# Patient Record
Sex: MALE | Race: WHITE
[De-identification: names, ages, dates, MRNs, and addresses within clinical notes are randomized per-mention and may not be internally consistent; named-entity substitution may affect disease eponyms.]

---

## 2017-03-16 ENCOUNTER — HOSPITAL ENCOUNTER (EMERGENCY)
Dept: HOSPITAL 59 - ER | Age: 32
Discharge: HOME | End: 2017-03-16
Payer: COMMERCIAL

## 2017-03-16 DIAGNOSIS — Z87.442: ICD-10-CM

## 2017-03-16 DIAGNOSIS — R10.31: Primary | ICD-10-CM

## 2017-03-16 LAB
ALBUMIN SERPL-MCNC: 4.8 GM/DL (ref 3.5–5)
ALBUMIN/GLOB SERPL: 1.6 {RATIO} (ref 1.1–1.8)
ALP SERPL-CCNC: 77 U/L (ref 38–126)
ALT SERPL-CCNC: 37 U/L (ref 21–72)
ANION GAP SERPL CALC-SCNC: 15.6 MMOL/L (ref 7–16)
APPEARANCE UR: CLEAR
AST SERPL-CCNC: 20 U/L (ref 17–59)
BASOPHILS NFR BLD: 0.6 % (ref 0–6)
BILIRUB SERPL-MCNC: 0.23 MG/DL (ref 0.2–1.3)
BILIRUB UR-MCNC: NEGATIVE MG/DL
BUN SERPL-MCNC: 15 MG/DL (ref 9–20)
CO2 SERPL-SCNC: 18.4 MMOL/L (ref 22–30)
COLOR UR: YELLOW
CREAT SERPL-MCNC: 0.9 MG/DL (ref 0.66–1.25)
EOSINOPHIL NFR BLD: 1.9 % (ref 0–6)
ERYTHROCYTE [DISTWIDTH] IN BLOOD BY AUTOMATED COUNT: 16.4 % (ref 11.5–14.5)
EST GLOMERULAR FILTRATION RATE: > 60 ML/MIN
GLOBULIN SER-MCNC: 3 GM/DL (ref 1.4–4.8)
GLUCOSE SERPL-MCNC: 117 MG/DL (ref 70–110)
GLUCOSE UR STRIP-MCNC: NEGATIVE MG/DL
GRANULOCYTES NFR BLD: 60.8 % (ref 47–80)
HCT VFR BLD CALC: 32.7 % (ref 42–52)
HGB BLD-MCNC: 9.7 GM/DL (ref 14–18)
KETONES UR QL STRIP: NEGATIVE
LIPASE SERPL-CCNC: 309 U/L (ref 23–300)
LYMPHOCYTES NFR BLD AUTO: 28.5 % (ref 16–45)
MCH RBC QN AUTO: 24.9 PG (ref 27–33)
MCHC RBC AUTO-ENTMCNC: 29.7 G/DL (ref 32–36)
MCV RBC AUTO: 84.1 FL (ref 81–97)
MONOCYTES NFR BLD: 8.2 % (ref 0–9)
NITRITE UR QL STRIP: NEGATIVE
PLATELET # BLD: 157 K/UL (ref 130–400)
PMV BLD AUTO: 10.2 FL (ref 7.4–10.4)
PROT SERPL-MCNC: 7.8 GM/DL (ref 6.3–8.2)
PROT UR QL STRIP: NEGATIVE
RBC # BLD AUTO: 3.89 M/UL (ref 4.4–5.7)
RBC # UR STRIP: NEGATIVE /UL
URINE LEUKOCYTE ESTERASE: NEGATIVE
UROBILINOGEN UR STRIP-ACNC: 0.2 E.U./DL (ref 0.2–1)
WBC # BLD AUTO: 5.3 K/UL (ref 4.2–12.2)

## 2017-03-16 PROCEDURE — 80053 COMPREHEN METABOLIC PANEL: CPT

## 2017-03-16 PROCEDURE — 83690 ASSAY OF LIPASE: CPT

## 2017-03-16 PROCEDURE — 85025 COMPLETE CBC W/AUTO DIFF WBC: CPT

## 2017-03-16 PROCEDURE — 81003 URINALYSIS AUTO W/O SCOPE: CPT

## 2017-03-16 PROCEDURE — 96372 THER/PROPH/DIAG INJ SC/IM: CPT

## 2017-03-16 PROCEDURE — 99284 EMERGENCY DEPT VISIT MOD MDM: CPT

## 2017-03-16 PROCEDURE — 99283 EMERGENCY DEPT VISIT LOW MDM: CPT

## 2017-03-16 RX ADMIN — PHENOBARBITAL ELIXIR ONE LIQUID: 16.2; .1037; .0065; .0194 ELIXIR ORAL at 01:49

## 2017-03-16 RX ADMIN — KETOROLAC TROMETHAMINE ONE MG: 30 INJECTION, SOLUTION INTRAMUSCULAR; INTRAVENOUS at 00:33

## 2017-03-16 RX ADMIN — SUCRALFATE ONE G: 1 SUSPENSION ORAL at 01:19

## 2017-03-16 NOTE — EMERGENCY DEPARTMENT RECORD
History of Present Illness





- General


Chief Complaint: Abdominal Pain


Stated Complaint: RT SIDE PAIN


Time Seen by Provider: 17 00:15


Source: Patient


Mode of Arrival: Ambulatory


Limitations: No limitations





- History of Present Illness


Initial Comments: 


The patient is here due to a 6 hour hx of R flank pain. The pain is sharp and 

stabbing. He denies any nausea, vomiting, diarrhea, fever or dysuria. The 

patient was here in the ER 4 days ago for the same issue and was found to have 

a very slightly elevated Lipase, normal urine and a CT with a normal pancreas 

and no hydroureter or stone blockage. There was a very small stone in the 

kidney. He also has a hx of chronic AP and has had his GB removed in the past. 

The patient denies any black or bloody stools. He did have an EGD in  that demonstrated Gastritis.





MD Complaint: Abdominal pain


Onset/Timin


-: Hour(s)


Location: R Flank, RLQ


Severity: Moderate


Quality: Sharp


Consistency: Constant


Improves With: Rest


Worsens With: Movement


Context: Other


Associated Symptoms: Denies other symptoms





- Related Data


 Home Medications











 Medication  Instructions  Recorded  Confirmed  Last Taken


 


Olanzapine 15 mg PO QHS 02/16/15 03/16/17 06/10/16


 


Omeprazole [Omeprazole] 40 mg PO DAILY 05/01/15 03/16/17 06/11/16


 


Doxepin HCl [Sinequan] 50 mg PO BID 12/07/15 03/16/17 06/10/16


 


Topiramate [Topiramate ER] 200 mg PO BID 12/07/15 03/16/17 06/10/16








 Previous Rx's











 Medication  Instructions  Recorded


 


Sumatriptan Succinate [Imitrex] 100 mg PO ASDIR PRN #4  09/19/15


 


Dicyclomine HCl [Bentyl] 10 mg PO Q8H #8 cap 17


 


Ondansetron [Zofran Odt] 4 mg PO Q8H #12 tab.rapdis 17


 


Sucralfate [Carafate] 1 gm PO QID #28 tablet 17











 Allergies











Allergy/AdvReac Type Severity Reaction Status Date / Time


 


Sulfa (Sulfonamide Allergy  HIVES Verified 12/07/15 17:21





Antibiotics)     














Travel Screening





- Travel/Exposure Within Last 30 Days


Have you traveled within the last 30 days?: No





- Travel/Exposure Within Last Year


Have you traveled outside the [place] in the last year?: No





- Additonal Travel Details


Have you been exposed to anyone with a communicable illness?: No





- Travel Symptoms


Symptom Screening: None





Review of Systems


Constitutional: Denies: Chills, Fever


Eyes: Denies: Eye discharge


ENT: Denies: Congestion


Respiratory: Denies: Cough, Dyspnea





Past Medical History





- SOCIAL HISTORY


Smoking Status: Current every day smoker


Alcohol Use: None


Drug Use: None





- RESPIRATORY


Hx Respiratory Disorders: No





- CARDIOVASCULAR


Hx Cardio Disorders: Yes





- NEURO


Hx Neuro Disorders: Yes


Hx Headaches: Yes


Hx of Migraines: Yes


Hx Seizures: Yes


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





- GI


Hx GI Disorders: Yes


Hx Abdominal Pain: Yes


Hx Reflux: Yes


Hx Nausea/Vomiting: Yes


Hx Ulcer: Yes





- 


Hx Genitourinary Disorders: Yes


Hx Kidney Stones: Yes





- ENDOCRINE


Hx Endocrine Disorders: No


Hx Diabetes: No


Hx Thyroid Disease: No





- MUSCULOSKELETAL


Hx Musculoskeletal Disorders: Yes


Hx Back Injury: Yes





- PSYCH


Hx Psych Problems: Yes


Hx Depression: Yes (bipolar)





- HEMATOLOGY/ONCOLOGY


Hx Hematology/Oncology Disorders: No





Family Medical History


Any Significant Family History?: No


Hx Cancer: Grandparents


Hx Depression: Mother


Hx Diabetes: Mother





Physical Exam





- General


General Appearance: Alert, Cooperative, No acute distress (The patient is very 

comfortable and in no distress.)





- Head


Head exam: Atraumatic, Normocephalic, Normal inspection





- Eye


Eye exam: Normal appearance, PERRL





- Neck


Neck exam: Normal inspection, Full ROM.  negative: Tenderness





- Respiratory


Respiratory exam: Normal lung sounds bilaterally.  negative: Respiratory 

distress





- Cardiovascular


Cardiovascular Exam: Regular rate, Normal rhythm, Normal heart sounds





- GI/Abdominal


GI/Abdominal exam: Soft, Tenderness (There is very mild RUQ tenderness.).  

negative: Guarding, Pulsatile mass, Rebound, Rigid





- Extremities


Extremities exam: Normal inspection, Full ROM, Normal capillary refill.  

negative: Tenderness





Course





 Vital Signs











  17





  00:06 00:16


 


Temperature 98.1 F 98.1 F


 


Pulse Rate [  113 H





Pulse Ox Probe]  


 


Respiratory  18





Rate  


 


Blood Pressure  126/96





[Left Arm]  


 


Pulse Ox  97














- Reevaluation(s)


Reevaluation #1: 


The patient is doing much better at this time. He denies any significant pain 

or discomfort. He states the GI medicine is helping him.





17 01:47





Reevaluation #2: 


The patient is doing much better at this time and is resting comfortably and is 

texting away on his phone. His pain has resolved and he feels 100% better. He 

will limit his home pain medicines, start the carafate and F/U with Dr. Rodriguez 

for a possible EGD and Colonoscopy. On exam his abdomen is very soft and 

nontender in all 4 quads. He is up ambulating with no pain or discomfort.





17 01:59








17 02:00








17 02:09








Medical Decision Making





- Data Complexity


MDM Data: Labs Ordered and/or Reviewed





- Lab Data


Result diagrams: 


 17 00:30





 17 00:30





Disposition


Disposition: Discharge


Clinical Impression: 


Abdominal pain


Qualifiers:


 Abdominal location: right lower quadrant Qualified Code(s): R10.31 - Right 

lower quadrant pain


Disposition: Home, Self-Care


Condition: (1) Good


Instructions:  Abdominal Pain (ED)


Additional Instructions: 


Please continue your home medicines and limit your pain medicines if possible. 

Please take the Carafate for the next week. Please see your PCP if not better 

in 1-2 days and follow up with Dr. Rodriguez when possible. Return to the ER for 

any increased pain, fever, or vomiting.


Prescriptions: 


Sucralfate [Carafate] 1 gm PO QID #28 tablet


Referrals: 


[facility] [Provider Group]


NAZ RODRIGUEZ [DOCTOR OF OSTEOPATH] - 


Forms:  Patient Portal Access


Time of Disposition: 01:55

## 2017-05-12 ENCOUNTER — HOSPITAL ENCOUNTER (OUTPATIENT)
Dept: HOSPITAL 59 - HOP | Age: 32
Discharge: HOME | End: 2017-05-12
Attending: INTERNAL MEDICINE
Payer: MEDICARE

## 2017-05-12 DIAGNOSIS — R19.7: Primary | ICD-10-CM

## 2017-05-12 DIAGNOSIS — F31.9: ICD-10-CM

## 2017-05-12 DIAGNOSIS — K57.30: ICD-10-CM

## 2017-05-12 DIAGNOSIS — K64.8: ICD-10-CM

## 2017-05-12 DIAGNOSIS — G40.89: ICD-10-CM

## 2017-05-15 NOTE — OPERATIVE NOTE
DATE OF SURGERY: 05/12/2017 



SURGEON: Avery Reina MD



OPERATION: COLONOSCOPY. 



INDICATIONS: This is a 31-year-old male with history of chronic diarrhea who presented for 
colonoscopy. 



POSTOPERATIVE DIAGNOSES:

1. Normal colonic and terminal ileum mucosa with no ulcerative or neoplastic lesions. 

2. Scattered diverticula, left colonic diverticulosis. 



ANESTHESIA: Per the department of anesthesia, the pulse oximetry was monitored throughout the 
duration of the procedure to maintain O2 saturation of 90% or greater. Supplemental oxygen was 
administered via nasal cannula. Cardiac and vital signs were monitored throughout the duration of 
the procedure, and they were stable.  



Description of the procedure of colonoscopy and risks and benefits of the procedure including the 
risk of bleeding and perforation, among others, were explained to the patient who voiced 
understanding and agreed to have the procedure done. Physical exam was performed, and the patient 
was found stable for sedation. 



PROCEDURE: The patient was then placed in the left lateral position, and sedation was initiated. 
Digital rectal exam was performed and showed some external hemorrhoids with no palpable rectal 
masses. The Olympus PCF-180AL colonoscope was then inserted into the rectum under direct 
visualization and advanced to the cecum without difficulty. The ileocecal valve and appendiceal 
orifice were identified and photographed. The colonic mucosa was carefully examined upon 
introduction of the colonoscope. There were scattered diverticula noted in the sigmoid and 
descending colon. There were no other lesions noted. The colonoscope was then withdrawn while 
carefully examining the colonic mucosal surfaces. No other lesions were noted. The ileocecal valve 
was intubated and terminal ileum mucosa was inspected for about 10 cm and it appeared normal. The 
colonoscope was then withdrawn while carefully examining the colonic mucosal surfaces. No other 
lesions were noted. In the rectum, retroflexion was performed and grade 1 internal hemorrhoids were 
noted. The colonoscope was then withdrawn, and the procedures were terminated. Random colon biopsies 
were obtained to rule out microscopic colitis. He remained with stable vital signs and was 
transferred to the recovery room. 



RECOMMENDATIONS: 

1. The patient should be on a high-fiber diet. 

2. He should have a repeat colonoscopy at age 49.  



Thank you for allowing me to participate in the care of your patient. 





_________________________

Avery Reina MD



CC: Dr. Laverne ROBIN

## 2017-05-29 ENCOUNTER — HOSPITAL ENCOUNTER (EMERGENCY)
Dept: HOSPITAL 59 - ER | Age: 32
Discharge: HOME | End: 2017-05-29
Payer: MEDICARE

## 2017-05-29 DIAGNOSIS — R10.12: ICD-10-CM

## 2017-05-29 DIAGNOSIS — R11.2: ICD-10-CM

## 2017-05-29 DIAGNOSIS — R74.8: Primary | ICD-10-CM

## 2017-05-29 DIAGNOSIS — R10.11: ICD-10-CM

## 2017-05-29 LAB
ALBUMIN SERPL-MCNC: 4.5 GM/DL (ref 3.5–5)
ALBUMIN/GLOB SERPL: 1.5 {RATIO} (ref 1.1–1.8)
ALP SERPL-CCNC: 134 U/L (ref 38–126)
ALT SERPL-CCNC: 417 U/L (ref 21–72)
ANION GAP SERPL CALC-SCNC: 7.8 MMOL/L (ref 7–16)
APPEARANCE UR: CLEAR
AST SERPL-CCNC: 189 U/L (ref 17–59)
BASOPHILS NFR BLD: 1 % (ref 0–6)
BILIRUB SERPL-MCNC: 0.42 MG/DL (ref 0.2–1.3)
BILIRUB UR-MCNC: NEGATIVE MG/DL
BUN SERPL-MCNC: 14 MG/DL (ref 9–20)
CO2 SERPL-SCNC: 19.2 MMOL/L (ref 22–30)
COLOR UR: YELLOW
CREAT SERPL-MCNC: 1.4 MG/DL (ref 0.66–1.25)
EOSINOPHIL NFR BLD: 2.4 % (ref 0–6)
ERYTHROCYTE [DISTWIDTH] IN BLOOD BY AUTOMATED COUNT: 21.4 % (ref 11.5–14.5)
EST GLOMERULAR FILTRATION RATE: > 60 ML/MIN
GLOBULIN SER-MCNC: 3 GM/DL (ref 1.4–4.8)
GLUCOSE SERPL-MCNC: 68 MG/DL (ref 70–110)
GLUCOSE UR STRIP-MCNC: NEGATIVE MG/DL
GRANULOCYTES NFR BLD: 64.5 % (ref 47–80)
HCT VFR BLD CALC: 40.4 % (ref 42–52)
HGB BLD-MCNC: 12.4 GM/DL (ref 14–18)
KETONES UR QL STRIP: NEGATIVE
LIPASE SERPL-CCNC: 183 U/L (ref 23–300)
LYMPHOCYTES NFR BLD AUTO: 26.3 % (ref 16–45)
MCH RBC QN AUTO: 28.4 PG (ref 27–33)
MCHC RBC AUTO-ENTMCNC: 30.7 G/DL (ref 32–36)
MCV RBC AUTO: 92.7 FL (ref 81–97)
MONOCYTES NFR BLD: 5.8 % (ref 0–9)
NITRITE UR QL STRIP: NEGATIVE
PLATELET # BLD: 255 K/UL (ref 130–400)
PMV BLD AUTO: 9.8 FL (ref 7.4–10.4)
PROT SERPL-MCNC: 7.5 GM/DL (ref 6.3–8.2)
PROT UR QL STRIP: NEGATIVE
RBC # BLD AUTO: 4.36 M/UL (ref 4.4–5.7)
RBC # UR STRIP: NEGATIVE /UL
URINE LEUKOCYTE ESTERASE: NEGATIVE
UROBILINOGEN UR STRIP-ACNC: 0.2 E.U./DL (ref 0.2–1)
WBC # BLD AUTO: 7.2 K/UL (ref 4.2–12.2)

## 2017-05-29 PROCEDURE — 96374 THER/PROPH/DIAG INJ IV PUSH: CPT

## 2017-05-29 PROCEDURE — 85025 COMPLETE CBC W/AUTO DIFF WBC: CPT

## 2017-05-29 PROCEDURE — 81003 URINALYSIS AUTO W/O SCOPE: CPT

## 2017-05-29 PROCEDURE — 83690 ASSAY OF LIPASE: CPT

## 2017-05-29 PROCEDURE — 80053 COMPREHEN METABOLIC PANEL: CPT

## 2017-05-29 PROCEDURE — 99284 EMERGENCY DEPT VISIT MOD MDM: CPT

## 2017-05-29 NOTE — EMERGENCY DEPARTMENT RECORD
History of Present Illness





- General


Chief complaint: Vomiting


Stated complaint: VOMITING/DRY HEAVES


Time Seen by Provider: 05/29/17 02:45


Source: Patient, Family


Mode of Arrival: Ambulatory


Limitations: No limitations





- History of Present Illness


Initial comments: 





pt vomited 3x tonight and had a bout of diarrhea


MD complaint: Nausea, Vomiting


Onset/Timing: 3


-: Hour(s)


Associated Abdominal Pain: Yes


Location: LUQ, RUQ


Radiation: None


Severity: Mild


Severity scale (1-10): 8


Quality: Dull, Other


Consistency: Constant


Improves with: Rest


Worsens with: Movement


Associated Symptoms: Nausea/vomiting





- Related Data


 Home Medications











 Medication  Instructions  Recorded  Confirmed  Last Taken


 


Olanzapine 15 mg PO QHS 02/16/15 05/29/17 06/10/16


 


Doxepin HCl [Sinequan] 50 mg PO BID 12/07/15 05/29/17 06/10/16


 


Topiramate [Topiramate ER] 100 mg PO BID 12/07/15 05/29/17 06/10/16


 


Cholecalciferol (Vitamin D3) 2,000 unit PO DAILY 05/29/17 05/29/17 Unknown





[Vitamin D3]    


 


Dicyclomine HCl [Dicyclomine HCl] 20 mg PO QID 05/29/17 05/29/17 Unknown


 


Ferrous Sulfate 325 mg PO DAILY 05/29/17 05/29/17 Unknown


 


Folic Acid 1 mg PO DAILY 05/29/17 05/29/17 Unknown


 


Lactulose 10 gm PO DAILY 05/29/17 05/29/17 Unknown


 


Omeprazole [Prilosec] 40 mg PO DAILY 05/29/17 05/29/17 Unknown


 


Propranolol HCl [Propranolol HCl 60 mg PO DAILY 05/29/17 05/29/17 Unknown





ER]    











 Allergies











Allergy/AdvReac Type Severity Reaction Status Date / Time


 


Sulfa (Sulfonamide Allergy  HIVES Verified 12/07/15 17:21





Antibiotics)     














Travel Screening





- Travel/Exposure Within Last 30 Days


Have you traveled within the last 30 days?: No





- Travel/Exposure Within Last Year


Have you traveled outside the [place] in the last year?: No





- Additonal Travel Details


Have you been exposed to anyone with a communicable illness?: No





- Travel Symptoms


Symptom Screening: None





Review of Systems


Reviewed: No additional complaints except as noted below


Constitutional: Reports: As per HPI.  Denies: Chills, Fever, Malaise, Night 

sweats, Weakness, Weight change


Eyes: Reports: As per HPI.  Denies: Eye discharge, Eye pain, Photophobia, 

Vision change


ENT: Reports: As per HPI.  Denies: Congestion, Dental pain, Ear pain, Epistaxis

, Hearing loss, Throat pain


Respiratory: Reports: As per HPI.  Denies: Cough, Dyspnea, Hemoptysis, Stridor, 

Wheezes


Cardiovascular: Reports: As per HPI.  Denies: Arrhythmia, Chest pain, Dyspnea 

on exertion, Edema, Murmurs, Orthopnea, Palpitations, Paroxysmal nocturnal 

dyspnea, Rheumatic Fever, Syncope


Endocrine: Reports: As per HPI.  Denies: Fatigue, Heat or cold intolerance, 

Polydipsia, Polyuria


Gastrointestinal: Reports: As per HPI.  Denies: Abdominal pain, Constipation, 

Diarrhea, Hematemesis, Hematochezia, Melena, Nausea, Vomiting


Genitourinary: Reports: As per HPI.  Denies: Dysuria, Frequency, Hematuria, 

Incontinence, Retention, Testicular pain, Testicular mass, Urgency


Musculoskeletal: Reports: As per HPI.  Denies: Arthralgia, Back pain, Gout, 

Joint swelling, Myalgia, Neck pain


Skin: Reports: As per HPI.  Denies: Bruising, Change in color, Change in hair/

nails, Lesions, Pruritus, Rash


Neurological: Reports: As per HPI.  Denies: Abnormal gait, Confusion, Headache, 

Numbness, Paresthesias, Seizure, Tingling, Tremors, Vertigo, Weakness


Psychiatric: Reports: As per HPI.  Denies: Anxiety, Auditory hallucinations, 

Depression, Homicidal thoughts, Suicidal thoughts, Visual hallucinations


Hematological/Lymphatic: Reports: As per HPI.  Denies: Anemia, Blood Clots, 

Easy bleeding, Easy bruising, Swollen glands





Past Medical History





- SOCIAL HISTORY


Smoking Status: Current every day smoker


Alcohol Use: None


Drug Use: None





- RESPIRATORY


Hx Respiratory Disorders: No





- CARDIOVASCULAR


Hx Cardio Disorders: Yes





- NEURO


Hx Neuro Disorders: Yes


Hx Headaches: Yes


Hx of Migraines: Yes


Hx Seizures: Yes


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





- GI


Hx GI Disorders: Yes


Hx Abdominal Pain: Yes


Hx Reflux: Yes


Hx Ulcer: Yes





- 


Hx Genitourinary Disorders: Yes


Hx Kidney Stones: Yes





- ENDOCRINE


Hx Endocrine Disorders: No


Hx Diabetes: No


Hx Thyroid Disease: No





- MUSCULOSKELETAL


Hx Musculoskeletal Disorders: Yes


Hx Back Injury: Yes





- PSYCH


Hx Psych Problems: Yes


Hx Depression: Yes (bipolar)





- HEMATOLOGY/ONCOLOGY


Hx Hematology/Oncology Disorders: No





Family Medical History


Any Significant Family History?: No


Hx Cancer: Grandparents


Hx Depression: Mother


Hx Diabetes: Mother





Physical Exam





- General


General Appearance: Alert, Oriented x3, Cooperative, Mild distress





- Head


Head exam: Normal inspection





- Eye


Eye exam: Normal appearance, PERRL, EOMI


Pupils: Normal accommodation





- ENT


ENT exam: Normal exam, Mucous membranes moist, Normal external ear exam, Normal 

orophraynx, TM's normal bilaterally


Ear exam: Normal external inspection.  negative: External canal tenderness


Nasal Exam: Normal inspection.  negative: Discharge, Sinus tenderness


Mouth exam: Normal external inspection, Tongue normal


Teeth exam: Normal inspection.  negative: Dental caries


Throat exam: Normal inspection.  negative: Tonsillar erythema, Tonsillar exudate





- Neck


Neck exam: Normal inspection, Full ROM.  negative: Tenderness





- Respiratory


Respiratory exam: Normal lung sounds bilaterally.  negative: Respiratory 

distress





- Cardiovascular


Cardiovascular Exam: Regular rate, Normal rhythm, Normal heart sounds





- GI/Abdominal


GI/Abdominal exam: Soft, Normal bowel sounds.  negative: Tenderness





- Rectal


Rectal exam: Deferred





- 


 exam: Deferred





- Extremities


Extremities exam: Normal inspection, Full ROM, Normal capillary refill.  

negative: Tenderness





- Back


Back exam: Reports: Normal inspection, Full ROM.  Denies: Muscle spasm, Rash 

noted, Tenderness





- Neurological


Neurological exam: Alert, CN II-XII intact, Normal gait, Oriented X3





- Psychiatric


Psychiatric exam: Normal affect, Normal mood





- Skin


Skin exam: Dry, Intact, Normal color, Warm





Course





 Vital Signs











  05/29/17





  02:40


 


Temperature 98.8 F


 


Pulse Rate [ 78





Pulse Ox Probe] 


 


Respiratory 18





Rate 


 


Blood Pressure 124/73





[Left Arm] 


 


Pulse Ox 96














- Reevaluation(s)


Reevaluation #1: 





05/29/17 05:23


pt feels much better





Medical Decision Making





- Management Options


MDM Management: No Additional Work-up Planned





- Data Complexity


MDM Data: Labs Ordered and/or Reviewed





- Lab Data


Result diagrams: 


 05/29/17 02:30





 05/29/17 02:30





Disposition


Disposition: Discharge


Clinical Impression: 


 Elevated liver enzymes





Vomiting


Qualifiers:


 Vomiting type: unspecified Vomiting Intractability: unspecified Nausea presence

: with nausea Qualified Code(s): R11.2 - Nausea with vomiting, unspecified





Disposition: Home, Self-Care


Condition: (1) Good


Instructions:  Acute Nausea and Vomiting (ED)


Additional Instructions: 


follow up with family doctor on tuesday without fail.  return sooner if worse


Forms:  Patient Portal Access

## 2017-05-29 NOTE — EMERGENCY DEPARTMENT RECORD
History of Present Illness





- General


Chief complaint: Vomiting


Stated complaint: VOMITING/DRY HEAVES


Time Seen by Provider: 05/29/17 02:45


Source: Patient, Family


Mode of Arrival: Ambulatory


Limitations: No limitations





- History of Present Illness


MD complaint: Nausea, Vomiting


Onset/Timing: 3


-: Hour(s)


Associated Abdominal Pain: Yes


Location: LUQ, RUQ


Radiation: None


Severity: Mild


Severity scale (1-10): 8


Quality: Dull, Other


Consistency: Constant


Improves with: Rest


Worsens with: Movement


Associated Symptoms: Nausea/vomiting





- Related Data


 Home Medications











 Medication  Instructions  Recorded  Confirmed  Last Taken


 


Olanzapine 15 mg PO QHS 02/16/15 05/29/17 06/10/16


 


Doxepin HCl [Sinequan] 50 mg PO BID 12/07/15 05/29/17 06/10/16


 


Topiramate [Topiramate ER] 100 mg PO BID 12/07/15 05/29/17 06/10/16


 


Cholecalciferol (Vitamin D3) 2,000 unit PO DAILY 05/29/17 05/29/17 Unknown





[Vitamin D3]    


 


Dicyclomine HCl [Dicyclomine HCl] 20 mg PO QID 05/29/17 05/29/17 Unknown


 


Ferrous Sulfate 325 mg PO DAILY 05/29/17 05/29/17 Unknown


 


Folic Acid 1 mg PO DAILY 05/29/17 05/29/17 Unknown


 


Lactulose 10 gm PO DAILY 05/29/17 05/29/17 Unknown


 


Omeprazole [Prilosec] 40 mg PO DAILY 05/29/17 05/29/17 Unknown


 


Propranolol HCl [Propranolol HCl 60 mg PO DAILY 05/29/17 05/29/17 Unknown





ER]    











 Allergies











Allergy/AdvReac Type Severity Reaction Status Date / Time


 


Sulfa (Sulfonamide Allergy  HIVES Verified 12/07/15 17:21





Antibiotics)     














Travel Screening





- Travel/Exposure Within Last 30 Days


Have you traveled within the last 30 days?: No





- Travel/Exposure Within Last Year


Have you traveled outside the [place] in the last year?: No





- Additonal Travel Details


Have you been exposed to anyone with a communicable illness?: No





- Travel Symptoms


Symptom Screening: None





Review of Systems


Constitutional: Reports: As per HPI.  Denies: Chills, Fever, Malaise, Night 

sweats, Weakness, Weight change


Eyes: Reports: As per HPI.  Denies: Eye discharge, Eye pain, Photophobia, 

Vision change


ENT: Reports: As per HPI.  Denies: Congestion, Dental pain, Ear pain, Epistaxis

, Hearing loss, Throat pain


Respiratory: Reports: As per HPI.  Denies: Cough, Dyspnea, Hemoptysis, Stridor, 

Wheezes


Cardiovascular: Reports: As per HPI.  Denies: Arrhythmia, Chest pain, Dyspnea 

on exertion, Edema, Murmurs, Orthopnea, Palpitations, Paroxysmal nocturnal 

dyspnea, Rheumatic Fever, Syncope


Endocrine: Reports: As per HPI.  Denies: Fatigue, Heat or cold intolerance, 

Polydipsia, Polyuria


Gastrointestinal: Reports: As per HPI.  Denies: Abdominal pain, Constipation, 

Diarrhea, Hematemesis, Hematochezia, Melena, Nausea, Vomiting


Genitourinary: Reports: As per HPI.  Denies: Dysuria, Frequency, Hematuria, 

Incontinence, Retention, Testicular pain, Testicular mass, Urgency


Musculoskeletal: Reports: As per HPI.  Denies: Arthralgia, Back pain, Gout, 

Joint swelling, Myalgia, Neck pain


Skin: Reports: As per HPI.  Denies: Bruising, Change in color, Change in hair/

nails, Lesions, Pruritus, Rash


Neurological: Reports: As per HPI.  Denies: Abnormal gait, Confusion, Headache, 

Numbness, Paresthesias, Seizure, Tingling, Tremors, Vertigo, Weakness


Psychiatric: Reports: As per HPI.  Denies: Anxiety, Auditory hallucinations, 

Depression, Homicidal thoughts, Suicidal thoughts, Visual hallucinations


Hematological/Lymphatic: Reports: As per HPI.  Denies: Anemia, Blood Clots, 

Easy bleeding, Easy bruising, Swollen glands





Past Medical History





- SOCIAL HISTORY


Smoking Status: Current every day smoker


Alcohol Use: None


Drug Use: None





- RESPIRATORY


Hx Respiratory Disorders: No





- CARDIOVASCULAR


Hx Cardio Disorders: Yes





- NEURO


Hx Neuro Disorders: Yes


Hx Headaches: Yes


Hx of Migraines: Yes


Hx Seizures: Yes


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





- GI


Hx GI Disorders: Yes


Hx Abdominal Pain: Yes


Hx Reflux: Yes


Hx Ulcer: Yes





- 


Hx Genitourinary Disorders: Yes


Hx Kidney Stones: Yes





- ENDOCRINE


Hx Endocrine Disorders: No


Hx Diabetes: No


Hx Thyroid Disease: No





- MUSCULOSKELETAL


Hx Musculoskeletal Disorders: Yes


Hx Back Injury: Yes





- PSYCH


Hx Psych Problems: Yes


Hx Depression: Yes (bipolar)





- HEMATOLOGY/ONCOLOGY


Hx Hematology/Oncology Disorders: No





Family Medical History


Any Significant Family History?: No


Hx Cancer: Grandparents


Hx Depression: Mother


Hx Diabetes: Mother





Physical Exam





- General


Limitations: No limitations





Course





 Vital Signs











  05/29/17 05/29/17 05/29/17





  02:40 03:23 04:21


 


Temperature 98.8 F  98.6 F


 


Pulse Rate [ 78 60 66





Pulse Ox Probe]   


 


Respiratory 18 17 18





Rate   


 


Blood Pressure 124/73 128/89 118/86





[Left Arm]   


 


Pulse Ox 96  99














  05/29/17





  05:41


 


Temperature 


 


Pulse Rate [ 62





Pulse Ox Probe] 


 


Respiratory 16





Rate 


 


Blood Pressure 112/88





[Left Arm] 


 


Pulse Ox 98














Medical Decision Making





- Lab Data


Result diagrams: 


 05/29/17 02:30





 05/29/17 02:30





 Lab Results











  05/29/17 05/29/17 05/29/17 Range/Units





  02:30 02:30 05:30 


 


WBC  7.2    (4.2-12.2)  K/uL


 


RBC  4.36 L    (4.40-5.70)  M/uL


 


Hgb  12.4 L    (14.0-18.0)  gm/dl


 


Hct  40.4 L    (42.0-52.0)  %


 


MCV  92.7    (81-97)  fl


 


MCH  28.4    (27-33)  pg


 


MCHC  30.7 L    (32-36)  g/dl


 


RDW  21.4 H    (11.5-14.5)  %


 


Plt Count  255    (130-400)  K/uL


 


MPV  9.8    (7.4-10.4)  fl


 


Gran %  64.5    (47-80)  %


 


Lymphocytes %  26.3    (16-45)  %


 


Monocytes %  5.8    (0-9)  %


 


Eosinophils %  2.4    (0-6)  %


 


Basophils %  1.0    (0-6)  %


 


Sodium   141   (136-145)  mmol/L


 


Potassium   4.1   (3.5-5.1)  mmol/L


 


Chloride   114 H   ()  mmol/L


 


Carbon Dioxide   19.2 L   (22-30)  mmol/L


 


Anion Gap   7.8   (7-16)  


 


BUN   14   (9-20)  mg/dL


 


Creatinine   1.4 H   (0.66-1.25)  mg/dL


 


Estimated GFR   > 60   ml/min


 


Random Glucose   68 L   ()  mg/dL


 


Calcium   8.7   (8.5-10.1)  mg/dL


 


Total Bilirubin   0.42   (0.2-1.3)  mg/dL


 


AST   189 H   (17-59)  U/L


 


ALT   417 H   (21-72)  U/L


 


Alkaline Phosphatase   134 H   ()  U/L


 


Total Protein   7.5   (6.3-8.2)  gm/dL


 


Albumin   4.5   (3.5-5.0)  gm/dL


 


Globulin   3.0   (1.4-4.8)  gm/dL


 


Albumin/Globulin Ratio   1.5   (1.1-1.8)  


 


Lipase   183   ()  U/L


 


Urine Color    Yellow  


 


Urine Appearance    Clear  


 


Urine pH    5.5  (5.0-8.0)  


 


Ur Specific Gravity    >= 1.030  (1.002-1.030)  


 


Urine Protein    Negative  (NEGATIVE)  


 


Urine Glucose (UA)    Negative  (NEGATIVE)  


 


Urine Ketones    Negative  (NEGATIVE)  


 


Urine Blood    Negative  (NEGATIVE)  


 


Urine Nitrite    Negative  (NEGATIVE)  


 


Urine Bilirubin    Negative  (NEGATIVE)  


 


Urine Urobilinogen    0.2  (0.20 - 1.00)  E.U./dL


 


Ur Leukocyte Esterase    Negative  (NEGATIVE)  














Disposition


Clinical Impression: 


 Elevated liver enzymes





Vomiting


Qualifiers:


 Vomiting type: unspecified Vomiting Intractability: unspecified Nausea presence

: with nausea Qualified Code(s): R11.2 - Nausea with vomiting, unspecified





Disposition: Home, Self-Care


Condition: (1) Good


Instructions:  Acute Nausea and Vomiting (ED)


Additional Instructions: 


follow up with family doctor on tuesday without fail regarding elevated liver 

enzymes and creatinine.  return sooner if worse


Forms:  Patient Portal Access

## 2018-02-23 ENCOUNTER — HOSPITAL ENCOUNTER (EMERGENCY)
Dept: HOSPITAL 59 - ER | Age: 33
Discharge: HOME | End: 2018-02-23
Payer: MEDICARE

## 2018-02-23 DIAGNOSIS — R07.89: ICD-10-CM

## 2018-02-23 DIAGNOSIS — F17.210: ICD-10-CM

## 2018-02-23 DIAGNOSIS — R10.11: Primary | ICD-10-CM

## 2018-02-23 DIAGNOSIS — Z87.820: ICD-10-CM

## 2018-02-23 LAB
ALBUMIN SERPL-MCNC: 4.6 G/DL (ref 4–5)
ALP SERPL-CCNC: 102 U/L (ref 40–129)
ALT SERPL-CCNC: 12 U/L (ref ?–41)
ANION GAP SERPL CALC-SCNC: 14 MMOL/L (ref 7–16)
AST SERPL-CCNC: 11 U/L (ref 10–50)
BASOPHILS NFR BLD: 0.5 % (ref 0–6)
BILIRUB DIRECT SERPL-MCNC: < 0.2 MG/DL (ref 0–0.3)
BILIRUB SERPL-MCNC: 0.2 MG/DL (ref 0.2–1)
BUN SERPL-MCNC: 14 MG/DL (ref 6–20)
CK MB SERPL-MCNC: 1.1 NG/ML (ref ?–6.73)
CO2 SERPL-SCNC: 19 MMOL/L (ref 22–29)
CREAT SERPL-MCNC: 0.8 MG/DL (ref 0.7–1.2)
CREATINE PHOSPHOKINASE: 49 U/L (ref 39–308)
EOSINOPHIL NFR BLD: 1.2 % (ref 0–6)
ERYTHROCYTE [DISTWIDTH] IN BLOOD BY AUTOMATED COUNT: 14.9 % (ref 11.5–14.5)
EST GLOMERULAR FILTRATION RATE: > 60 ML/MIN
GLUCOSE SERPL-MCNC: 128 MG/DL (ref 74–109)
GRANULOCYTES NFR BLD: 43.7 % (ref 47–80)
HCT VFR BLD CALC: 41.7 % (ref 42–52)
HGB BLD-MCNC: 14.5 GM/DL (ref 14–18)
LYMPHOCYTES NFR BLD AUTO: 45.1 % (ref 16–45)
MCH RBC QN AUTO: 38 PG (ref 27–33)
MCHC RBC AUTO-ENTMCNC: 34.8 G/DL (ref 32–36)
MCV RBC AUTO: 109.4 FL (ref 81–97)
MONOCYTES NFR BLD: 9.5 % (ref 0–9)
PLATELET # BLD: 237 K/UL (ref 130–400)
PMV BLD AUTO: 7.9 FL (ref 7.4–10.4)
PROT SERPL-MCNC: 7 G/DL (ref 6.6–8.7)
RBC # BLD AUTO: 3.81 M/UL (ref 4.4–5.7)
WBC # BLD AUTO: 4.3 K/UL (ref 4.2–12.2)

## 2018-02-23 PROCEDURE — 93010 ELECTROCARDIOGRAM REPORT: CPT

## 2018-02-23 PROCEDURE — 83690 ASSAY OF LIPASE: CPT

## 2018-02-23 PROCEDURE — 84484 ASSAY OF TROPONIN QUANT: CPT

## 2018-02-23 PROCEDURE — 99284 EMERGENCY DEPT VISIT MOD MDM: CPT

## 2018-02-23 PROCEDURE — 80048 BASIC METABOLIC PNL TOTAL CA: CPT

## 2018-02-23 PROCEDURE — 71100 X-RAY EXAM RIBS UNI 2 VIEWS: CPT

## 2018-02-23 PROCEDURE — 85025 COMPLETE CBC W/AUTO DIFF WBC: CPT

## 2018-02-23 PROCEDURE — 82550 ASSAY OF CK (CPK): CPT

## 2018-02-23 PROCEDURE — 93005 ELECTROCARDIOGRAM TRACING: CPT

## 2018-02-23 PROCEDURE — 80076 HEPATIC FUNCTION PANEL: CPT

## 2018-02-23 PROCEDURE — 71046 X-RAY EXAM CHEST 2 VIEWS: CPT

## 2018-02-23 PROCEDURE — 96374 THER/PROPH/DIAG INJ IV PUSH: CPT

## 2018-02-23 PROCEDURE — 82553 CREATINE MB FRACTION: CPT
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
## 2018-02-24 NOTE — RADIOLOGY REPORT
DATE:  02/23/2018. 



EXAM:  TWO VIEWS OF THE CHEST.



HISTORY:  The patient has substernal chest pain.



TECHNIQUE:  Two views of the chest were provided.



COMPARISON:  03/20/2014.



FINDINGS:  The cardiomediastinal silhouette is within normal limits for size 
and contour.  A moderate to large hiatal hernia is noted.



The toan appear unremarkable.



There is no radiographic evidence of a focal infiltrate or pleural effusion.  
No pneumothorax is noted.  There is a mildly elevated left hemidiaphragm.  This 
finding is not changed with respect to the prior examination.



IMPRESSION:  

1.  A MODERATE TO LARGE HIATAL HERNIA.

2.  OTHERWISE STABLE RADIOGRAPHIC APPEARANCE OF THE CHEST WITH RESPECT TO THE 
PRIOR EXAMINATION.



JOB NUMBER:  [id number]
[facility]

## 2018-02-24 NOTE — RADIOLOGY REPORT
DATE:  02/23/2018. 



EXAM:  THREE VIEWS OF THE LEFT RIBS.



HISTORY:  The patient has a history of a fall.



TECHNIQUE:  Three views of the left ribs are provided without comparison 
examinations.



FINDINGS:  Contour deformity of the left upper posterolateral ribs is noted 
compatible with the patient's history of old, healed left-sided rib fractures.  
There is a contour deformity of the lateral margin of the left scapula which 
may be related to an exostosis and/or a prior healed fracture.



There is no radiographic evidence of a new, acute fracture of the left ribs.  
If there is further clinical concern then a bone scan can be obtained for 
further evaluation.



IMPRESSION:  

NO RADIOGRAPHIC EVIDENCE OF AN ACUTE FRACTURE OR DISLOCATION OF THE LEFT RIBS.  
IF THERE IS FURTHER CLINICAL CONCERN, THEN A BONE SCAN CAN BE OBTAINED FOR 
FURTHER EVALUATION.



JOB NUMBER:  [id number]
[facility]

## 2018-11-19 ENCOUNTER — HOSPITAL ENCOUNTER (EMERGENCY)
Dept: HOSPITAL 59 - ER | Age: 33
LOS: 1 days | Discharge: HOME | End: 2018-11-20
Payer: MEDICARE

## 2018-11-19 DIAGNOSIS — M79.604: Primary | ICD-10-CM

## 2018-11-19 DIAGNOSIS — F17.210: ICD-10-CM

## 2018-11-19 PROCEDURE — 85379 FIBRIN DEGRADATION QUANT: CPT

## 2018-11-19 PROCEDURE — 80053 COMPREHEN METABOLIC PANEL: CPT

## 2018-11-19 PROCEDURE — 99283 EMERGENCY DEPT VISIT LOW MDM: CPT

## 2018-11-20 LAB
ALBUMIN SERPL-MCNC: 4.6 G/DL (ref 4–5)
ALBUMIN/GLOB SERPL: 1.8 {RATIO} (ref 1.1–1.8)
ALP SERPL-CCNC: 109 U/L (ref 40–129)
ALT SERPL-CCNC: 12 U/L (ref ?–41)
ANION GAP SERPL CALC-SCNC: 20 MMOL/L (ref 7–16)
AST SERPL-CCNC: 12 U/L (ref 10–50)
BILIRUB SERPL-MCNC: 0.3 MG/DL (ref 0.2–1)
BUN SERPL-MCNC: 15 MG/DL (ref 6–20)
CO2 SERPL-SCNC: 15 MMOL/L (ref 22–29)
CREAT SERPL-MCNC: 1.3 MG/DL (ref 0.7–1.2)
EST GLOMERULAR FILTRATION RATE: > 60 ML/MIN
GLOBULIN SER-MCNC: 2.6 GM/DL (ref 1.4–4.8)
GLUCOSE SERPL-MCNC: 142 MG/DL (ref 74–109)
PROT SERPL-MCNC: 7.2 G/DL (ref 6.6–8.7)
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
